# Patient Record
Sex: MALE | Race: WHITE | Employment: FULL TIME | URBAN - METROPOLITAN AREA
[De-identification: names, ages, dates, MRNs, and addresses within clinical notes are randomized per-mention and may not be internally consistent; named-entity substitution may affect disease eponyms.]

---

## 2024-06-13 ENCOUNTER — OFFICE VISIT (OUTPATIENT)
Age: 54
End: 2024-06-13

## 2024-06-13 VITALS
HEART RATE: 103 BPM | WEIGHT: 184.4 LBS | RESPIRATION RATE: 18 BRPM | HEIGHT: 65 IN | TEMPERATURE: 99.5 F | DIASTOLIC BLOOD PRESSURE: 83 MMHG | BODY MASS INDEX: 30.72 KG/M2 | SYSTOLIC BLOOD PRESSURE: 132 MMHG | OXYGEN SATURATION: 96 %

## 2024-06-13 DIAGNOSIS — J02.9 ACUTE SORE THROAT: ICD-10-CM

## 2024-06-13 DIAGNOSIS — J06.9 VIRAL UPPER RESPIRATORY ILLNESS: Primary | ICD-10-CM

## 2024-06-13 LAB
GROUP A STREP ANTIGEN, POC: NEGATIVE
VALID INTERNAL CONTROL, POC: NORMAL

## 2024-06-13 RX ORDER — DEXTROMETHORPHAN HYDROBROMIDE AND PROMETHAZINE HYDROCHLORIDE 15; 6.25 MG/5ML; MG/5ML
5 SYRUP ORAL 4 TIMES DAILY PRN
Qty: 118 ML | Refills: 0 | Status: SHIPPED | OUTPATIENT
Start: 2024-06-13 | End: 2024-06-20

## 2024-06-13 NOTE — PROGRESS NOTES
Hamilton Palmer (:  1970) is a 54 y.o. male,New patient, here for evaluation of the following chief complaint(s):  Pharyngitis and Otalgia (Patient has sore throat with right ear pain. Symptoms started . Patient also has cough. Patient took at home COVID test this morning which was negative.)      Assessment & Plan :  Visit Diagnoses and Associated Orders       Viral upper respiratory illness    -  Primary    promethazine-dextromethorphan (PROMETHAZINE-DM) 6.25-15 MG/5ML syrup [90619]           Acute sore throat        AMB POC RAPID STREP A [79753 CPT(R)]                    Rapid strep test negative.  Centor score 1, cx not indicated. Low grade fever, mild tachycardia 2/2 fever. To treat for viral URI with symptomatically. May use promethazine DM PRN for cough. To rotate tylenol and ibuprofen Q4H PRN and as directed on packaging for throat pain and fever control. May use cough drops, nasal saline, cool mist humidifier, rest, increased fluid intake for symptomatic relief. To monitor symptoms and follow-up if symptoms worsen or do not improve on current treatment plan. To ED for severe CP, chest tightness, SOB, rapid worsening of symptoms. Patient verbalized understanding, no questions or concerns at this time .         Subjective :  HPI  HPI:   54 y.o. male presents with symptoms of sore throat, R ear pain, productive cough with yellow sputum, rhinitis, nasal congestion, sneezing, x 4 days. Tested negative for COVID-19 on a home test this morning. Rates throat pain as 7-8/10 in severity, sore in nature, does not radiate. Has not tried anything to treat his symptoms. Nighttime, sleep, makes symptoms worse. Denies sick contacts. Denies CP, chest tightness, SOB, HA, sinus pain, N/V/D, abdominal pain, swollen glands, rash.            Vitals:    24 0834 24 0836   BP: 136/89 132/83   Site: Right Upper Arm Right Upper Arm   Position: Sitting Sitting   Cuff Size: Medium Adult Medium Adult   Pulse:

## 2025-03-13 ENCOUNTER — OFFICE VISIT (OUTPATIENT)
Age: 55
End: 2025-03-13

## 2025-03-13 VITALS
TEMPERATURE: 99.3 F | OXYGEN SATURATION: 95 % | DIASTOLIC BLOOD PRESSURE: 97 MMHG | HEART RATE: 96 BPM | SYSTOLIC BLOOD PRESSURE: 130 MMHG | BODY MASS INDEX: 30.95 KG/M2 | WEIGHT: 186 LBS | RESPIRATION RATE: 16 BRPM

## 2025-03-13 DIAGNOSIS — J06.9 UPPER RESPIRATORY TRACT INFECTION, UNSPECIFIED TYPE: Primary | ICD-10-CM

## 2025-03-13 DIAGNOSIS — R50.9 FEVER, UNSPECIFIED FEVER CAUSE: ICD-10-CM

## 2025-03-13 LAB
INFLUENZA A ANTIGEN, POC: NEGATIVE
INFLUENZA B ANTIGEN, POC: NEGATIVE

## 2025-03-13 RX ORDER — BENZONATATE 200 MG/1
200 CAPSULE ORAL 3 TIMES DAILY PRN
Qty: 21 CAPSULE | Refills: 0 | Status: SHIPPED | OUTPATIENT
Start: 2025-03-13 | End: 2025-03-20

## 2025-03-13 NOTE — PROGRESS NOTES
3/13/2025   Hamilton Palmer (: 1970) is a 54 y.o. male, established patient, here for evaluation of the following chief complaint(s):  Cold Symptoms (Pt presents with dry cough x 2 months and fever, bilateral ear fullness, and now productive cough since yesterday; pt took covid test today and was negative  )     ASSESSMENT/PLAN:  Below is the assessment and plan developed based on review of pertinent history, physical exam, labs, studies, and medications.  1. Upper respiratory tract infection, unspecified type  -     benzonatate (TESSALON) 200 MG capsule; Take 1 capsule by mouth 3 times daily as needed for Cough, Disp-21 capsule, R-0Normal  2. Fever, unspecified fever cause  -     POCT Influenza A/B Antigen    Patient in no acute distress today.  Lung sounds are normal.  Patient denies any shortness of breath or wheezing   History and physical today are consistent with an upper respiratory illness.  Offered to order chest x-ray due to report that he has also had a dry cough for about 2 months, patient declined.  Vital signs are stable, physical exam is benign, no evidence of bacterial infection at this time  Treat symptomatically  Tylenol/ibuprofen for fevers, chills, aches and pains  Mucinex OTC to help thin secretions  Benzonatate up to 3x daily for cough  Hot tea with honey, saline sinus rinses, throat lozenges  Lots of fluid, plenty of rest  Follow up here or with PCP if symptoms persist or worsen  Go to ED if you develop any shortness of breath, chest pain or if you are unable to tolerate food or fluids  Handout given with care instructions  2. OTC for symptom management. Increase fluid intake, ensure adequate nutritional intake.  3. Follow up with PCP as needed.  4. Go to ED with development of any acute symptoms.     Follow up:    Follow up immediately for any new, worsening or changes or if symptoms are not improving over the next 5-7 days.     SUBJECTIVE/OBJECTIVE:  54-year-old here today with complaint

## 2025-03-13 NOTE — PATIENT INSTRUCTIONS
Thank you for visiting [facility] today.    Flonase (over the counter) nasal spray, once a day  Take benzonatate as prescribed for cough  OTC plain Mucinex to help thin secretions  Ibuprofen (400-800 mg) every 8 hours; Tylenol (325-500) mg every 6 hours   Zyrtec/Xyzal/Allegra/Claritin during the day or Benadryl at night may help with allergies.  You may use the decongestant version of these medications as well.  Simple foods like chicken noodle soup, smoothies, hot tea with lemon and honey may also help  Steam inhalation, humidifier, warm compresses  Increase oral fluids to maintain hydration  Avoid smoking and minimize contact with environmental irritants      Please follow up with your primary care provider if your symptoms last more than 10 days or worsen.    Please go immediately to the Emergency Department if you develop:  Fever higher than 102F (38.9C), sudden and severe pain in the face and head, trouble seeing or seeing double, trouble thinking clearly, swelling or redness around one or both eyes or a stiff neck